# Patient Record
Sex: FEMALE | Race: ASIAN | NOT HISPANIC OR LATINO | Employment: FULL TIME | URBAN - METROPOLITAN AREA
[De-identification: names, ages, dates, MRNs, and addresses within clinical notes are randomized per-mention and may not be internally consistent; named-entity substitution may affect disease eponyms.]

---

## 2023-08-12 ENCOUNTER — OFFICE VISIT (OUTPATIENT)
Dept: FAMILY MEDICINE | Facility: CLINIC | Age: 23
End: 2023-08-12

## 2023-08-12 VITALS
TEMPERATURE: 99.3 F | HEART RATE: 81 BPM | DIASTOLIC BLOOD PRESSURE: 81 MMHG | WEIGHT: 194.4 LBS | RESPIRATION RATE: 18 BRPM | OXYGEN SATURATION: 97 % | SYSTOLIC BLOOD PRESSURE: 120 MMHG

## 2023-08-12 DIAGNOSIS — J02.0 STREP PHARYNGITIS: ICD-10-CM

## 2023-08-12 DIAGNOSIS — R07.0 THROAT PAIN: Primary | ICD-10-CM

## 2023-08-12 LAB — DEPRECATED S PYO AG THROAT QL EIA: POSITIVE

## 2023-08-12 PROCEDURE — 87880 STREP A ASSAY W/OPTIC: CPT | Performed by: STUDENT IN AN ORGANIZED HEALTH CARE EDUCATION/TRAINING PROGRAM

## 2023-08-12 PROCEDURE — 99203 OFFICE O/P NEW LOW 30 MIN: CPT | Performed by: STUDENT IN AN ORGANIZED HEALTH CARE EDUCATION/TRAINING PROGRAM

## 2023-08-12 RX ORDER — PENICILLIN V POTASSIUM 500 MG/1
500 TABLET, FILM COATED ORAL 2 TIMES DAILY
Qty: 20 TABLET | Refills: 0 | Status: SHIPPED | OUTPATIENT
Start: 2023-08-12 | End: 2023-08-22

## 2023-08-12 NOTE — PROGRESS NOTES
Assessment & Plan     Throat pain  - Streptococcus A Rapid Screen w/Reflex to PCR - Clinic Collect    Strep pharyngitis  Strep positive. Discussed options. Reviewed transmission, prevention, complications. Pt with cost concerns. Has ibuprofen at home. Discussed return precautions.   - penicillin V (VEETID) 500 MG tablet  Dispense: 20 tablet; Refill: 0    Return if symptoms worsen or fail to improve.    Irma Schmitt MD  [facility] JAD Escalona is a 23 year old, presenting for the following health issues:  Pharyngitis (Pt states started about 5 days sore throat and White spots on back of throat )    HPI   Sore throat 5 days. No cough. Had a fever, not so much anymore. Used some advil.   Painful to eat and drink, but managing. Took photo, swollen tonsils, white spots on back of throat. Starting to improve a bit on the left side. Still prevalent on the right side. Some congestion.   Otherwise feeling well.       Review of Systems   Constitutional, HEENT, cardiovascular, pulmonary, gi and gu systems are negative, except as otherwise noted.      Objective    /81 (BP Location: Right arm, Patient Position: Sitting, Cuff Size: Adult Regular)   Pulse 81   Temp 99.3  F (37.4  C) (Oral)   Resp 18   Wt 88.2 kg (194 lb 6.4 oz)   SpO2 97%   There is no height or weight on file to calculate BMI.  Physical Exam  Constitutional:       Appearance: Normal appearance.   HENT:      Head: Normocephalic and atraumatic.      Right Ear: External ear normal.      Left Ear: External ear normal.      Nose: Nose normal.      Mouth/Throat:      Mouth: Mucous membranes are moist.      Pharynx: Oropharyngeal exudate and posterior oropharyngeal erythema present.   Eyes:      Conjunctiva/sclera: Conjunctivae normal.   Pulmonary:      Effort: Pulmonary effort is normal.      Breath sounds: Normal breath sounds.   Musculoskeletal:      Cervical back: Normal range of motion and neck supple. No tenderness.    Skin:     General: Skin is warm.      Capillary Refill: Capillary refill takes less than 2 seconds.   Neurological:      General: No focal deficit present.      Mental Status: She is alert.   Psychiatric:         Mood and Affect: Mood normal.         Behavior: Behavior normal.         Thought Content: Thought content normal.         Judgment: Judgment normal.

## 2023-08-13 ENCOUNTER — HEALTH MAINTENANCE LETTER (OUTPATIENT)
Age: 23
End: 2023-08-13

## 2023-08-24 ENCOUNTER — OFFICE VISIT (OUTPATIENT)
Dept: FAMILY MEDICINE | Facility: CLINIC | Age: 23
End: 2023-08-24

## 2023-08-24 VITALS
RESPIRATION RATE: 16 BRPM | HEART RATE: 95 BPM | DIASTOLIC BLOOD PRESSURE: 80 MMHG | SYSTOLIC BLOOD PRESSURE: 121 MMHG | WEIGHT: 196.1 LBS | TEMPERATURE: 98.6 F | OXYGEN SATURATION: 96 %

## 2023-08-24 DIAGNOSIS — J03.90 TONSILLITIS: ICD-10-CM

## 2023-08-24 DIAGNOSIS — J06.9 VIRAL URI: Primary | ICD-10-CM

## 2023-08-24 DIAGNOSIS — R07.0 THROAT PAIN: ICD-10-CM

## 2023-08-24 LAB
DEPRECATED S PYO AG THROAT QL EIA: NEGATIVE
GROUP A STREP BY PCR: NOT DETECTED

## 2023-08-24 PROCEDURE — 99213 OFFICE O/P EST LOW 20 MIN: CPT | Performed by: FAMILY MEDICINE

## 2023-08-24 PROCEDURE — 87651 STREP A DNA AMP PROBE: CPT | Performed by: FAMILY MEDICINE

## 2023-08-24 RX ORDER — TRIAMCINOLONE ACETONIDE 1 MG/G
OINTMENT TOPICAL
COMMUNITY
Start: 2023-08-10

## 2023-08-24 RX ORDER — PREDNISONE 20 MG/1
60 TABLET ORAL DAILY
Qty: 15 TABLET | Refills: 0 | Status: SHIPPED | OUTPATIENT
Start: 2023-08-24 | End: 2023-08-29

## 2023-08-24 NOTE — PROGRESS NOTES
Assessment:     Viral URI    Throat pain  - Streptococcus A Rapid Screen w/Reflex to PCR - Clinic Collect  - Group A Streptococcus PCR Throat Swab    Tonsillitis  - predniSONE (DELTASONE) 20 MG tablet  Dispense: 15 tablet; Refill: 0    Plan:     Symptoms consistent with a viral upper respiratory infection.  Strep is negative.  Given her markedly enlarged tonsils and history of sleep apnea I did elect to give her a prescription for a burst of prednisone. Discussed the typical course of symptoms.  Noantibiotics indicated at this time.  Recommend symptomatic treatment such as decongestants and acetominephen or ibuprofen as needed.  Recommend follow up if getting worse or not improving.      MEDICATIONS:   Orders Placed This Encounter   Medications    etonogestrel (NEXPLANON) 68 MG IMPL     Sig: Inject 68 mg Subcutaneous    triamcinolone (KENALOG) 0.1 % external ointment     Sig: Apply to affected areas twice per day for 2 weeks, then use three times as needed.    predniSONE (DELTASONE) 20 MG tablet     Sig: Take 3 tablets (60 mg) by mouth daily for 5 days     Dispense:  15 tablet     Refill:  0       Subjective:       23 year old female presents for evaluation of a sore throat and some nasal congestion.  She was seen and treated for strep throat 12 days ago and finish her entire course of antibiotics.  For the past 2 days she started to get a bit of a sore throat again and a lot of yellowish nasal congestion.  No shortness of breath or wheezing.  She has sleep apnea and does find that her feels feels swollen and sleeping has been more difficult because of this as well as her sleep apnea.  No difficulty managing her secretions.    There is no problem list on file for this patient.      No past medical history on file.    No past surgical history on file.    Current Outpatient Medications   Medication    etonogestrel (NEXPLANON) 68 MG IMPL    predniSONE (DELTASONE) 20 MG tablet    triamcinolone (KENALOG) 0.1 % external  ointment     No current facility-administered medications for this visit.       No Known Allergies    No family history on file.    Social History     Socioeconomic History    Marital status: Single     Spouse name: None    Number of children: None    Years of education: None    Highest education level: None   Tobacco Use    Smoking status: Never    Smokeless tobacco: Never         Review of Systems  Pertinent items are noted in HPI.      Objective:                 General Appearance:    /80   Pulse 95   Temp 98.6  F (37  C) (Oral)   Resp 16   Wt 89 kg (196 lb 1.6 oz)   SpO2 96%         Alert, pleasant, cooperative, no distress, appears stated age   Head:    Normocephalic, without obvious abnormality, atraumatic   Eyes:    Conjunctiva/corneas clear   Ears:    Normal TM's without erythema or bulging. Normal external ear canals, both ears   Nose:   Nares normal, septum midline, mucosa normal, no drainage    or sinus tenderness   Throat: Lateral enlarged tonsils without exudate or significant erythema.  There is no trismus.  No hot potato voice.   Neck:   Supple, symmetrical, trachea midline, no adenopathy    Lungs:     Clear to auscultation bilaterally without wheezes, rales, or rhonchi, respirations unlabored    Heart:    Regular rate and rhythm, S1 and S2 normal, no murmur, rub or gallop       Extremities:   Extremities normal, atraumatic, no cyanosis or edema   Skin:   Skin color, texture, turgor normal, no rashes or lesions           Results for orders placed or performed in visit on 08/24/23   Streptococcus A Rapid Screen w/Reflex to PCR - Clinic Collect     Status: Normal    Specimen: Throat; Swab   Result Value Ref Range    Group A Strep antigen Negative Negative       This note has been dictated using voice recognition software. Any grammatical or context distortions are unintentional and inherent to the software

## 2024-10-06 ENCOUNTER — HEALTH MAINTENANCE LETTER (OUTPATIENT)
Age: 24
End: 2024-10-06